# Patient Record
Sex: FEMALE | Race: WHITE | Employment: UNEMPLOYED | URBAN - METROPOLITAN AREA
[De-identification: names, ages, dates, MRNs, and addresses within clinical notes are randomized per-mention and may not be internally consistent; named-entity substitution may affect disease eponyms.]

---

## 2022-11-27 ENCOUNTER — APPOINTMENT (OUTPATIENT)
Dept: GENERAL RADIOLOGY | Age: 42
End: 2022-11-27
Payer: COMMERCIAL

## 2022-11-27 ENCOUNTER — HOSPITAL ENCOUNTER (EMERGENCY)
Age: 42
Discharge: HOME | End: 2022-11-28
Attending: EMERGENCY MEDICINE
Payer: COMMERCIAL

## 2022-11-27 VITALS
TEMPERATURE: 98.4 F | OXYGEN SATURATION: 98 % | RESPIRATION RATE: 18 BRPM | SYSTOLIC BLOOD PRESSURE: 103 MMHG | HEIGHT: 63 IN | BODY MASS INDEX: 28.35 KG/M2 | WEIGHT: 160 LBS | DIASTOLIC BLOOD PRESSURE: 68 MMHG | HEART RATE: 68 BPM

## 2022-11-27 DIAGNOSIS — M79.671 ACUTE FOOT PAIN, RIGHT: Primary | ICD-10-CM

## 2022-11-27 PROCEDURE — 99283 EMERGENCY DEPT VISIT LOW MDM: CPT

## 2022-11-27 PROCEDURE — 73630 X-RAY EXAM OF FOOT: CPT

## 2022-11-27 PROCEDURE — 73610 X-RAY EXAM OF ANKLE: CPT

## 2022-11-27 RX ORDER — ALBUTEROL SULFATE 90 UG/1
AEROSOL, METERED RESPIRATORY (INHALATION)
COMMUNITY
Start: 2022-11-17

## 2022-11-27 SDOH — ECONOMIC STABILITY: FOOD INSECURITY: WITHIN THE PAST 12 MONTHS, THE FOOD YOU BOUGHT JUST DIDN'T LAST AND YOU DIDN'T HAVE MONEY TO GET MORE.: NEVER TRUE

## 2022-11-27 ASSESSMENT — ENCOUNTER SYMPTOMS
COLOR CHANGE: 1
BACK PAIN: 0

## 2022-11-27 ASSESSMENT — PAIN - FUNCTIONAL ASSESSMENT: PAIN_FUNCTIONAL_ASSESSMENT: 0-10

## 2022-11-27 ASSESSMENT — PAIN SCALES - GENERAL: PAINLEVEL_OUTOF10: 5

## 2022-11-27 NOTE — Clinical Note
Olivier Merchant was seen and treated in our emergency department on 11/27/2022. She may return to work on 11/29/2022. If you have any questions or concerns, please don't hesitate to call.       Ying Ramirez MD

## 2022-11-28 NOTE — ED PROVIDER NOTES
[facility]        Pt Name: Meliton Gaming  MRN: [id number]  [place] 1980  Date of evaluation: 11/27/2022  Provider: Lachelle Looney MD  PCP: No primary care provider on file. CHIEF COMPLAINT       Chief Complaint   Patient presents with    Ankle Pain     Pt ambulates into ED with complaints of right foot and ankle pain after falling down steps 6 days ago. Bruising and swelling noted to foot. PMS intact       HISTORY OFPRESENT ILLNESS   (Location/Symptom, Timing/Onset, Context/Setting, Quality, Duration, Modifying Factors,Severity)  Note limiting factors. Meliton Gaming is a 38 y.o. female presenting today due to concern for tripping at her home 6 days ago and ultimately landing awkwardly on her right foot and ankle and initially having some pain that day although still being able to walk on it and she has noticed over the past couple of days more bruising and swelling to the right lateral foot which was concerning to her in case she had a fracture that she was not aware of. She denied feeling lightheaded or dizzy before the fall and just tripped trying to go down the stairs. She does not want anything for pain at this time. She denies any knee or hip pain. She did not hit her head and feels fine elsewhere on her body besides for the right foot and ankle. No other concerns or injuries at this time. She fell down 3 steps. She is not on any blood thinners. She is trying to pack her house in order to move and therefore has been on her feet a lot over the last couple of days. She denies any numbness or weakness in the legs or feet. Due to concern for worsening bruising and swelling to the right lateral foot, she came to the emergency department for an x-ray. REVIEW OF SYSTEMS    (2-9 systems for level 4, 10 or more for level 5)     Review of Systems   Constitutional:  Negative for chills and fever.    Musculoskeletal: Positive for arthralgias (right ankle and foot) and joint swelling (right lateral foot). Negative for back pain and gait problem. Skin:  Positive for color change (bruising to right lateral foot only). Negative for wound. Neurological:  Negative for dizziness, syncope, weakness, light-headedness, numbness and headaches. Hematological:  Does not bruise/bleed easily. Psychiatric/Behavioral:  The patient is not nervous/anxious. Positives and Pertinent negatives as per HPI. PASTMEDICAL HISTORY     Past Medical History:   Diagnosis Date    Mental disorder     as teenager pt stated was dx with manic depressive disorder on medication, not currently         SURGICAL HISTORY       Past Surgical History:   Procedure Laterality Date     SECTION, LOW TRANSVERSE      failed induction         CURRENT MEDICATIONS       Discharge Medication List as of 2022 12:30 AM        CONTINUE these medications which have NOT CHANGED    Details   albuterol sulfate HFA (PROVENTIL;VENTOLIN;PROAIR) 108 (90 Base) MCG/ACT inhaler Historical Med             ALLERGIES     Patient has no known allergies.     FAMILY HISTORY       Family History   Problem Relation Age of Onset    Diabetes Mother     High Blood Pressure Mother     Asthma Sister     Heart Disease Maternal Grandmother     Mental Illness Maternal Grandmother           SOCIAL HISTORY       Social History     Socioeconomic History    Marital status: Single     Spouse name: None    Number of children: None    Years of education: None    Highest education level: None   Tobacco Use    Smoking status: Every Day     Packs/day: 0.50     Years: 10.00     Pack years: 5.00     Types: Cigarettes    Smokeless tobacco: Never   Vaping Use    Vaping Use: Never used   Substance and Sexual Activity    Alcohol use: No    Drug use: No    Sexual activity: Yes     Partners: Male       SCREENINGS    Deborah Coma Scale  Eye Opening: Spontaneous  Best Verbal Response: Oriented  Best Motor Response: Obeys commands  Deborah Coma Scale Score: 15           PHYSICAL EXAM    (up to 7 for level 4, 8 or more for level 5)     ED Triage Vitals [11/27/22 2118]   BP Temp Temp Source Heart Rate Resp SpO2 Height Weight   103/68 98.4 °F (36.9 °C) Oral 68 18 98 % 5' 3\" (1.6 m) 160 lb (72.6 kg)       Physical Exam  Vitals and nursing note reviewed. Constitutional:       General: She is awake. She is not in acute distress. Appearance: Normal appearance. She is well-developed, well-groomed and overweight. She is not ill-appearing, toxic-appearing or diaphoretic. HENT:      Head: Normocephalic and atraumatic. Cardiovascular:      Rate and Rhythm: Normal rate and regular rhythm. Pulses: Normal pulses. Dorsalis pedis pulses are 2+ on the right side and 2+ on the left side. Posterior tibial pulses are 2+ on the right side and 2+ on the left side. Pulmonary:      Effort: Pulmonary effort is normal. No tachypnea, bradypnea or respiratory distress. Breath sounds: Normal air entry. No stridor. Musculoskeletal:         General: Swelling (right lateral foot only) and tenderness (right lateral foot only) present. No deformity or signs of injury. Normal range of motion. Cervical back: Neck supple. Right hip: No tenderness or bony tenderness. Normal range of motion. Right knee: No bony tenderness. Normal range of motion. No tenderness. Right lower leg: Normal. No swelling, deformity, lacerations, tenderness or bony tenderness. No edema. Left lower leg: No edema. Right ankle: No swelling, deformity, ecchymosis or lacerations. No tenderness. Normal range of motion. Normal pulse. Right Achilles Tendon: No tenderness. Left ankle: No swelling, deformity, ecchymosis or lacerations. No tenderness. Normal range of motion. Normal pulse. Left Achilles Tendon: No tenderness. Right foot: Normal range of motion and normal capillary refill.  Swelling, tenderness and bony tenderness present. No deformity, bunion, Charcot foot, foot drop, prominent metatarsal heads, laceration or crepitus. Normal pulse. Left foot: Normal range of motion and normal capillary refill. No tenderness, bony tenderness or crepitus. Normal pulse. Feet:    Feet:      Right foot:      Skin integrity: No ulcer, blister, skin breakdown, erythema or warmth. Toenail Condition: Right toenails are normal.      Left foot:      Skin integrity: Skin integrity normal.      Toenail Condition: Left toenails are normal.   Skin:     General: Skin is warm and dry. Capillary Refill: Capillary refill takes less than 2 seconds. Right foot      Coloration: Skin is not ashen, cyanotic, jaundiced or pale. Findings: Bruising (right lateral foot) and ecchymosis present. No abrasion, abscess, erythema, laceration, lesion, rash or wound. Neurological:      General: No focal deficit present. Mental Status: She is alert and oriented to person, place, and time. Mental status is at baseline. Sensory: No sensory deficit. Motor: No weakness. Psychiatric:         Mood and Affect: Mood normal.         Behavior: Behavior normal. Behavior is cooperative. DIAGNOSTIC RESULTS   :    Labs Reviewed - No data to display    All other labs were within normal range or not returned asof this dictation. EKG: All EKG's are interpreted by the Emergency Department Physician who either signs or Co-signs this chart in the absence of a cardiologist.        RADIOLOGY:   Non-plain film images such as CT, Ultrasound and MRI are read by the radiologist. Clarine Degree images are visualized and preliminarily interpreted by the  ED Provider with the belowfindings:        Interpretation per the Radiologist below, if available at the time of this note:    XR FOOT RIGHT (MIN 3 VIEWS)   Final Result   No acute osseous abnormality of the right ankle or right foot evident.          XR ANKLE RIGHT (MIN 3 VIEWS)   Final Result   No acute osseous abnormality of the right ankle or right foot evident. PROCEDURES   Unless otherwise noted below, none     Procedures    CRITICAL CARE TIME   N/A    CONSULTS:  None    EMERGENCY DEPARTMENT COURSE and DIFFERENTIAL DIAGNOSIS/MDM:   Vitals:    Vitals:    11/27/22 2118   BP: 103/68   Pulse: 68   Resp: 18   Temp: 98.4 °F (36.9 °C)   TempSrc: Oral   SpO2: 98%   Weight: 160 lb (72.6 kg)   Height: 5' 3\" (1.6 m)       Patient was given the following medications:  Medications - No data to display    Patient was evaluated due to having persistent right lateral foot pain along with some mild ankle discomfort ever since tripping down 3 of her steps and landing awkwardly on the right foot and ankle. She denies any other concerns for trauma at this time and is only worried about her foot and ankle. There was obvious bruising and swelling to the right lateral foot and therefore an x-ray of her right foot and right ankle were obtained. She did not want anything for pain at this time. X-ray ultimately did come back negative for fracture. I did evaluate her ambulation following this and she did not have any antalgic gait and walk very comfortably and therefore I suspect she most likely did have a mild foot sprain at this point based on how easily she walked. I did recommend trying to elevate her leg and ice it to help with comfort but otherwise at this point weightbearing as tolerated. She is aware that if she develops any worsening foot pain with inability to walk, new numbness or weakness in the foot, signs of infection, and return to the emergency department for repeat evaluation, but otherwise follow-up with either primary doctor or orthopedics over the next 1 to 2 weeks for any other concerns and if she feels like her right foot is unstable at all, then see about outpatient MRI.   She was well-appearing and in no acute distress at time of discharge and felt comfortable with this plan. I was the primary provider for the patient. The patient tolerated their visit well. The patient and / or the family were informed of the results of any tests, a time was given to answer questions. FINAL IMPRESSION      1.  Acute foot pain, right          DISPOSITION/PLAN   DISPOSITION Decision To Discharge 11/28/2022 12:28:11 AM-Discharged in stable condition      PATIENT REFERRED TO:  [facility] Emergency Department  [address] [place] [address]  [phone]  Go to   If symptoms worsen    [facility]) Pre-Services  [phone]  Call in 1 week  As needed - primary doctor    [place]  [facility] [address] [place] [address]  [phone]  Call   As needed    DISCHARGEMEDICATIONS:  Discharge Medication List as of 11/28/2022 12:30 AM          DISCONTINUED MEDICATIONS:  Discharge Medication List as of 11/28/2022 12:30 AM        STOP taking these medications       ibuprofen (ADVIL;MOTRIN) 600 MG tablet Comments:   Reason for Stopping:         Prenatal Vit-Fe Fumarate-FA (PRENATAL 1 PLUS 1) 65-1 MG TABS Comments:   Reason for Stopping:                      (Please note that portions of this note were completed with a voicerecognition program.  Efforts were made to edit the dictations but occasionally words are mis-transcribed.)    Tommy Castellano MD (electronically signed)           Tommy Castellano MD  11/28/22 0540

## 2022-11-28 NOTE — ED NOTES
D/C: Order noted for d/c. Pt confirmed d/c paperwork does have correct name. Discharge and education instructions reviewed with patient. Teach-back successful. Pt verbalized understanding and signed d/c papers. Pt denied questions at this time. No acute distress noted. Patient instructed to follow-up as noted - return to emergency department if symptoms worsen. Patient verbalized understanding. Discharged per EDMD with discharge instructions. Pt discharged to private vehicle. Patient stable upon departure. Thanked patient for choosing [facility] for care.       Maxine Morrison RN  11/28/22 8127

## 2022-11-28 NOTE — DISCHARGE INSTRUCTIONS
Take Tylenol or ibuprofen as needed for pain. Do not take more than 2.5 g of Tylenol per day. Use ice for swelling. Elevate the leg as needed. Follow-up with your primary doctor or orthopedics over the next week for any other concerns for possible repeat x-ray although at this time there is no sign of any fracture. If you notice any concern for ankle or foot instability, you may want to get an MRI. Return to the emergency department for worsening pain with inability to walk, signs of infection, fever, new numbness or weakness in the toes, or any other concerns.